# Patient Record
Sex: MALE
[De-identification: names, ages, dates, MRNs, and addresses within clinical notes are randomized per-mention and may not be internally consistent; named-entity substitution may affect disease eponyms.]

---

## 2022-04-11 ENCOUNTER — TRANSCRIPTION ENCOUNTER (OUTPATIENT)
Age: 27
End: 2022-04-11

## 2024-01-29 PROBLEM — Z00.00 ENCOUNTER FOR PREVENTIVE HEALTH EXAMINATION: Status: ACTIVE | Noted: 2024-01-29

## 2024-02-09 ENCOUNTER — APPOINTMENT (OUTPATIENT)
Dept: NEUROLOGY | Facility: CLINIC | Age: 29
End: 2024-02-09
Payer: COMMERCIAL

## 2024-02-09 VITALS
TEMPERATURE: 98.1 F | WEIGHT: 260 LBS | SYSTOLIC BLOOD PRESSURE: 121 MMHG | OXYGEN SATURATION: 96 % | BODY MASS INDEX: 39.4 KG/M2 | HEIGHT: 68 IN | HEART RATE: 96 BPM | DIASTOLIC BLOOD PRESSURE: 77 MMHG

## 2024-02-09 PROCEDURE — 99204 OFFICE O/P NEW MOD 45 MIN: CPT

## 2024-02-20 NOTE — END OF VISIT
[FreeTextEntry3] : I, Dr. Lucas Kothari, personally performed the evaluation and management (E/M) services for this new patient who presents today with new problem(s).  That E/M includes conducting the clinically appropriate interval history &/or exam, assessing all new/exacerbated conditions, and establishing a new plan of care.  Today, IVETH Barnes, collaborated on the evaluation and management service for this new problem and will follow the plan of care established by me going forward.

## 2024-02-20 NOTE — PHYSICAL EXAM
[FreeTextEntry1] :  Darrin made poor eye contact and was avoidant. Spoke in short sentences, repetitive, and with some echolalila and stuttering of them. Generally understanding of the simple aspects of the examination and discussion.

## 2024-02-20 NOTE — HISTORY OF PRESENT ILLNESS
[FreeTextEntry1] : Darrin is a 28 year old male with autism disorder presenting with sleep issues. Accompanied by his parents.  History taken from pt's parents. Concerned pt is not sleeping enough. Pt gets in bed around 11PM nightly, but parents noticed he does not fall asleep for several hours. Now tracking with fit bit, per data it takes 2-3 hours for pt to fall asleep. Only sleeping around 5h per night. Wakes up on his own around 7AM, appears well rested. Parents deny snoring.   Taking 1mg Melatonin nightly, parents believe it helps him fall asleep a bit faster. Tried higher doses but no improvement - also made him groggy the next day.   Unable to express if he is tired, but parents note he does not seem too sleepy during the day. If he gets less than 4h per night, he does wake up in a bad mood but usually ok with 5-6 hours. Very rarely will take a nap throughout the day.   About once a month, will wake up between 3-5 AM and run around. Has not happened in the past few weeks. Does not see a psychiatrist, not on any meds for behavior.   Exercise nearly every evening. Goes to the gym for swimming or training most nights.   Poor diet. Eating hot dogs and fries at least 5 nights a week, shortly before going to bed.

## 2024-02-20 NOTE — DISCUSSION/SUMMARY
[FreeTextEntry1] : Impression: 1) Insomnia - issues with sleep initiation and duration. Mood issues if sleeping less than 5 hours per night  2) Autism spectrum disorder, verbal but with significant communication barriers.  Plan: 1) Trial of Mirtazapine 15mg. Continue Melatonin 1mg

## 2024-02-29 ENCOUNTER — RX RENEWAL (OUTPATIENT)
Age: 29
End: 2024-02-29

## 2024-04-03 ENCOUNTER — APPOINTMENT (OUTPATIENT)
Dept: NEUROLOGY | Facility: CLINIC | Age: 29
End: 2024-04-03

## 2024-04-03 NOTE — DISCUSSION/SUMMARY
[FreeTextEntry1] : Impression: 1) Insomnia - issues with sleep initiation and duration. Mood issues if sleeping less than 5 hours per night  2) Autism disorder   Plan: 1)

## 2024-04-03 NOTE — HISTORY OF PRESENT ILLNESS
[FreeTextEntry1] : 4/3/24 HPI: Darrin is a 28 year old male with autism disorder presenting with sleep issues. Accompanied by his parents.  Mirtazapine 15mg with Melatonin 1mg -    -------------------------- Last seen 2/9/24: History taken from pt's parents - pt mostly nonverbal. Concerned pt is not sleeping enough. Pt gets in bed around 11PM nightly, but parents noticed he does not fall asleep for several hours. Now tracking with fit bit, per data it takes 2-3 hours for pt to fall asleep. Only sleeping around 5h per night. Wakes up on his own around 7AM, appears well rested. Parents deny snoring.   Taking 1mg Melatonin nightly, parents believe it helps him fall asleep a bit faster. Tried higher doses but no improvement - also made him groggy the next day.   Unable to express if he is tired, but parents note he does not seem too sleepy during the day. Very rarely will take a nap throughout the day. About once a month, will wake up between 3-5 AM and run around. Has not happened in the past few weeks. Does not see a psychiatrist, not on any meds for behavior.   Exercise nearly every evening. Goes to the gym for swimming or training most nights. Poor diet. Eating hot dogs and fries at least 5 nights a week, shortly before going to bed.

## 2024-04-05 ENCOUNTER — APPOINTMENT (OUTPATIENT)
Dept: NEUROLOGY | Facility: CLINIC | Age: 29
End: 2024-04-05
Payer: COMMERCIAL

## 2024-04-05 PROCEDURE — 99214 OFFICE O/P EST MOD 30 MIN: CPT

## 2024-04-05 NOTE — DISCUSSION/SUMMARY
[FreeTextEntry1] : Impression: 1) Insomnia - issues with sleep initiation and duration. Mood issues if sleeping less than 5 hours per night. Improving on Mirtazapine 15mg  2) Autism spectrum disorder, verbal but with significant communication barriers  Plan: 1) Push back timing of Mirtazapine to around 9:30/10PM to help with earlier sleep initiation

## 2024-04-05 NOTE — REASON FOR VISIT
[Home] : at home, [unfilled] , at the time of the visit. [Medical Office: ([facility])___] : at the medical office located in  [Patient] : the patient [Follow-Up: _____] : a [unfilled] follow-up visit

## 2024-04-05 NOTE — PHYSICAL EXAM
[FreeTextEntry1] : General: Constitutional: Sitting comfortably in NAD. Psychiatric: well-groomed, appropriate affect  Cranial Nerves: II: CHRISTAL. III/IV/VI: EOM Full. VII: Face appears symmetric. VIII: Normal to screening. IX/X: Normal phonation. XI: Trapezius Symmetric. XII: Tongue midline.

## 2024-04-05 NOTE — HISTORY OF PRESENT ILLNESS
[FreeTextEntry1] : 4/5/24 HPI: Darrin is a 28 year old male presenting for a follow up visit for insomnia. Accompanied by his father.  Dad notes Mirtazapine 15mg started working very quickly, now averaging 6-7 hours per night compared to 5 in the past. Some nights sleeping up to 8 hours. Still taking him about 2-3 hours to fall asleep, Dad giving pill around 11PM.   Attending day program 2x per week, on those days has to wake up earlier than usual. Otherwise, will sleep in as late as he wants. Has slept up until 10:30AM but most days he is up around 9.  No improvement in his mood or behavior on Mirtazapine.  ----------------------- Last seen 2/9/24: History taken from pt's parents. Concerned pt is not sleeping enough. Pt gets in bed around 11PM nightly, but parents noticed he does not fall asleep for several hours. Now tracking with fit bit, per data it takes 2-3 hours for pt to fall asleep. Only sleeping around 5h per night. Wakes up on his own around 7AM, appears well rested. Parents deny snoring.  Taking 1mg Melatonin nightly, parents believe it helps him fall asleep a bit faster. Tried higher doses but no improvement - also made him groggy the next day.  Unable to express if he is tired, but parents note he does not seem too sleepy during the day. If he gets less than 4h per night, he does wake up in a bad mood but usually ok with 5-6 hours. Very rarely will take a nap throughout the day.  About once a month, will wake up between 3-5 AM and run around. Has not happened in the past few weeks. Does not see a psychiatrist, not on any meds for behavior.  Exercise nearly every evening. Goes to the gym for swimming or training most nights.  Poor diet. Eating hot dogs and fries at least 5 nights a week, shortly before going to bed.

## 2024-05-10 ENCOUNTER — RX RENEWAL (OUTPATIENT)
Age: 29
End: 2024-05-10

## 2024-05-10 RX ORDER — MIRTAZAPINE 15 MG/1
15 TABLET, FILM COATED ORAL
Qty: 30 | Refills: 3 | Status: ACTIVE | COMMUNITY
Start: 2024-02-09 | End: 1900-01-01

## 2024-06-05 ENCOUNTER — APPOINTMENT (OUTPATIENT)
Dept: OPHTHALMOLOGY | Facility: CLINIC | Age: 29
End: 2024-06-05
Payer: COMMERCIAL

## 2024-06-05 ENCOUNTER — NON-APPOINTMENT (OUTPATIENT)
Age: 29
End: 2024-06-05

## 2024-06-05 PROCEDURE — 92002 INTRM OPH EXAM NEW PATIENT: CPT

## 2024-06-12 ENCOUNTER — APPOINTMENT (OUTPATIENT)
Dept: OPHTHALMOLOGY | Facility: CLINIC | Age: 29
End: 2024-06-12
Payer: COMMERCIAL

## 2024-06-12 ENCOUNTER — NON-APPOINTMENT (OUTPATIENT)
Age: 29
End: 2024-06-12

## 2024-06-12 PROCEDURE — 92002 INTRM OPH EXAM NEW PATIENT: CPT

## 2024-06-27 ENCOUNTER — APPOINTMENT (OUTPATIENT)
Dept: NEUROLOGY | Facility: CLINIC | Age: 29
End: 2024-06-27
Payer: COMMERCIAL

## 2024-06-27 DIAGNOSIS — G47.00 INSOMNIA, UNSPECIFIED: ICD-10-CM

## 2024-06-27 DIAGNOSIS — R63.5 ABNORMAL WEIGHT GAIN: ICD-10-CM

## 2024-06-27 DIAGNOSIS — F84.0 AUTISTIC DISORDER: ICD-10-CM

## 2024-06-27 PROCEDURE — 99214 OFFICE O/P EST MOD 30 MIN: CPT

## 2024-08-01 ENCOUNTER — APPOINTMENT (OUTPATIENT)
Dept: OPHTHALMOLOGY | Facility: CLINIC | Age: 29
End: 2024-08-01

## 2024-08-26 ENCOUNTER — TRANSCRIPTION ENCOUNTER (OUTPATIENT)
Age: 29
End: 2024-08-26

## 2024-08-26 ENCOUNTER — APPOINTMENT (OUTPATIENT)
Dept: OPHTHALMOLOGY | Facility: HOSPITAL | Age: 29
End: 2024-08-26

## 2024-08-26 ENCOUNTER — OUTPATIENT (OUTPATIENT)
Dept: OUTPATIENT SERVICES | Facility: HOSPITAL | Age: 29
LOS: 1 days | End: 2024-08-26
Payer: COMMERCIAL

## 2024-08-26 VITALS
WEIGHT: 276.46 LBS | TEMPERATURE: 98 F | RESPIRATION RATE: 17 BRPM | HEIGHT: 68 IN | SYSTOLIC BLOOD PRESSURE: 127 MMHG | HEART RATE: 81 BPM | DIASTOLIC BLOOD PRESSURE: 81 MMHG | OXYGEN SATURATION: 99 %

## 2024-08-26 VITALS
RESPIRATION RATE: 17 BRPM | SYSTOLIC BLOOD PRESSURE: 121 MMHG | HEART RATE: 88 BPM | DIASTOLIC BLOOD PRESSURE: 69 MMHG | OXYGEN SATURATION: 96 %

## 2024-08-26 DIAGNOSIS — H40.003 PREGLAUCOMA, UNSPECIFIED, BILATERAL: ICD-10-CM

## 2024-08-26 DIAGNOSIS — Z98.890 OTHER SPECIFIED POSTPROCEDURAL STATES: Chronic | ICD-10-CM

## 2024-08-26 PROCEDURE — 92018 COMPL OPH EXAM GENERAL ANES: CPT

## 2024-08-26 NOTE — ASU PATIENT PROFILE, ADULT - INTERNATIONAL TRAVEL
Patient Quality Outreach    Patient is due for the following:   Colon Cancer Screening  Physical Preventive Adult Physical      Topic Date Due    COVID-19 Vaccine (1) Never done    Zoster (Shingles) Vaccine (1 of 2) Never done       Next Steps:   Schedule a Adult Preventative    Type of outreach:    Sent Diveboard message.      Questions for provider review:               Hector Hughes MA         No

## 2024-08-26 NOTE — ASU PATIENT PROFILE, ADULT - FALL HARM RISK - HARM RISK INTERVENTIONS
Communicate Risk of Fall with Harm to all staff/Reinforce activity limits and safety measures with patient and family/Review medications for side effects contributing to fall risk/Tailored Fall Risk Interventions/Visual Cue: Yellow wristband and red socks/Bed in lowest position, wheels locked, appropriate side rails in place/Call bell, personal items and telephone in reach/Instruct patient to call for assistance before getting out of bed or chair/Non-slip footwear when patient is out of bed/[place] to call system/Physically safe environment - no spills, clutter or unnecessary equipment/Purposeful Proactive Rounding/Room/bathroom lighting operational, light cord in reach

## 2024-08-26 NOTE — ASU DISCHARGE PLAN (ADULT/PEDIATRIC) - CARE PROVIDER_API CALL
Feli Frye  Ophthalmology  [address] [place] [address]  Phone: [phone]  Fax: [phone]  Scheduled Appointment: 08/27/2024

## 2024-08-26 NOTE — ASU PATIENT PROFILE, ADULT - NSICDXPASTMEDICALHX_GEN_ALL_CORE_FT
PAST MEDICAL HISTORY:  Asthma     Autism     Bilateral preglaucoma     Insomnia     Seasonal allergies     Severe obesity (BMI >= 40)

## 2024-08-30 ENCOUNTER — RX RENEWAL (OUTPATIENT)
Age: 29
End: 2024-08-30

## 2024-11-07 ENCOUNTER — APPOINTMENT (OUTPATIENT)
Dept: NEUROLOGY | Facility: CLINIC | Age: 29
End: 2024-11-07
Payer: COMMERCIAL

## 2024-11-07 DIAGNOSIS — F84.0 AUTISTIC DISORDER: ICD-10-CM

## 2024-11-07 DIAGNOSIS — G47.00 INSOMNIA, UNSPECIFIED: ICD-10-CM

## 2024-11-07 PROCEDURE — 99214 OFFICE O/P EST MOD 30 MIN: CPT

## 2024-11-21 ENCOUNTER — NON-APPOINTMENT (OUTPATIENT)
Age: 29
End: 2024-11-21

## 2024-12-05 ENCOUNTER — APPOINTMENT (OUTPATIENT)
Dept: OPHTHALMOLOGY | Facility: CLINIC | Age: 29
End: 2024-12-05
Payer: COMMERCIAL

## 2024-12-05 ENCOUNTER — NON-APPOINTMENT (OUTPATIENT)
Age: 29
End: 2024-12-05

## 2024-12-05 PROCEDURE — 92012 INTRM OPH EXAM EST PATIENT: CPT

## 2024-12-19 ENCOUNTER — RX RENEWAL (OUTPATIENT)
Age: 29
End: 2024-12-19

## 2025-01-16 ENCOUNTER — NON-APPOINTMENT (OUTPATIENT)
Age: 30
End: 2025-01-16

## 2025-01-16 ENCOUNTER — APPOINTMENT (OUTPATIENT)
Dept: OPHTHALMOLOGY | Facility: CLINIC | Age: 30
End: 2025-01-16
Payer: COMMERCIAL

## 2025-01-16 PROCEDURE — 92012 INTRM OPH EXAM EST PATIENT: CPT

## 2025-02-12 ENCOUNTER — APPOINTMENT (OUTPATIENT)
Dept: OPHTHALMOLOGY | Facility: CLINIC | Age: 30
End: 2025-02-12
Payer: COMMERCIAL

## 2025-02-12 ENCOUNTER — NON-APPOINTMENT (OUTPATIENT)
Age: 30
End: 2025-02-12

## 2025-02-12 PROCEDURE — 92012 INTRM OPH EXAM EST PATIENT: CPT

## 2025-04-09 ENCOUNTER — RX RENEWAL (OUTPATIENT)
Age: 30
End: 2025-04-09

## 2025-06-12 ENCOUNTER — NON-APPOINTMENT (OUTPATIENT)
Age: 30
End: 2025-06-12

## 2025-06-12 ENCOUNTER — APPOINTMENT (OUTPATIENT)
Dept: OPHTHALMOLOGY | Facility: CLINIC | Age: 30
End: 2025-06-12
Payer: COMMERCIAL

## 2025-06-12 PROCEDURE — 92002 INTRM OPH EXAM NEW PATIENT: CPT

## 2025-07-28 ENCOUNTER — RX RENEWAL (OUTPATIENT)
Age: 30
End: 2025-07-28

## (undated) DEVICE — SOL BALANCE SALT 15ML

## (undated) DEVICE — WARMING BLANKET LOWER ADULT

## (undated) DEVICE — GLV 6 PROTEXIS (WHITE)

## (undated) DEVICE — VENODYNE/SCD SLEEVE CALF MEDIUM

## (undated) DEVICE — APPLICATOR COTTON TIP 3" STERILE

## (undated) DEVICE — DRAPE 1/2 SHEET 40X57"